# Patient Record
Sex: MALE | Race: WHITE
[De-identification: names, ages, dates, MRNs, and addresses within clinical notes are randomized per-mention and may not be internally consistent; named-entity substitution may affect disease eponyms.]

---

## 2018-07-24 ENCOUNTER — HOSPITAL ENCOUNTER (EMERGENCY)
Dept: HOSPITAL 41 - JD.ED | Age: 33
Discharge: HOME | End: 2018-07-24
Payer: MEDICAID

## 2018-07-24 VITALS — SYSTOLIC BLOOD PRESSURE: 141 MMHG | DIASTOLIC BLOOD PRESSURE: 94 MMHG

## 2018-07-24 DIAGNOSIS — S61.411A: Primary | ICD-10-CM
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
## 2018-07-24 NOTE — EDM.PDOC
ED HPI GENERAL MEDICAL PROBLEM





- General


Chief Complaint: Upper Extremity Injury/Pain


Stated Complaint: R THUMB LAC


Time Seen by Provider: 07/24/18 10:56


Source of Information: Reports: Patient


History Limitations: Reports: No Limitations





- History of Present Illness


INITIAL COMMENTS - FREE TEXT/NARRATIVE: 





33-year-old male presents for evaluation and treatment the laceration to the 

right dorsal hand. Injury occurred prior to arrival in the ER. Patient is self-

employed. He was at work. His cutting some tin for a roof when he accidentally 

cut himself. He has a 2 cm laceration to the right dorsal hand over the 

proximal phalnex. He denies any numbness or tingling. States it bled 

extensively after the injury but bleeding is controlled now. No obvious 

decreased range of motion. Some discomfort with flexion.





Tetanus is up-to-date. Last tetanus was about 7 months ago.





Patient is right handed.


Onset: Today


Location: Reports: Upper Extremity, Right


  ** Right Hand


Pain Score (Numeric/FACES): 4





- Related Data


 Allergies











Allergy/AdvReac Type Severity Reaction Status Date / Time


 


No Known Allergies Allergy   Verified 07/24/18 10:35











Home Meds: 


 Home Meds





Albuterol Sulfate [Proair Hfa] 1 puff INH ASDIRECTED PRN 07/24/18 [History]











Past Medical History


Respiratory History: Reports: Asthma





Review of Systems





- Review of Systems


Review Of Systems: See Below


Musculoskeletal: Reports: Hand Pain (from wound), Other (no decreased ROM)


Skin: Reports: Wound (right dorsal hand)


Neurological: Denies: Numbness, Tingling





ED EXAM, GENERAL





- Physical Exam


Exam: See Below


Exam Limited By: No Limitations


General Appearance: Alert, WD/WN, No Apparent Distress


Respiratory/Chest: No Respiratory Distress


Peripheral Pulses: 2+: Radial (R)


Extremities: Normal Range of Motion (patient is able to flex and extend right 

thumb with and without resistance. Able to adduct and abudct thumb with and 

withot resistance. Able to oppose thumb to all fingers. Able to make a fist. ), 

Normal Capillary Refill


Neurological: Alert, Oriented, Normal Cognition


Psychiatric: Normal Affect, Normal Mood


Skin Exam: Warm, Dry, Wound/Incision (2cm laceration to the dorsal right hand 

over the distal metacarpal)





ED TRAUMA EXTREMITY PROCEDURES





- Laceration/Wound Repair


  ** Right Dorsal Digit - 1st (Thumb)


Lac/Wound Length In cm: 2


Appearance: Subcutaneous, Clean


Distal NVT: Neuro & Vascular Intact, No Tendon Injury


Anesthetic Type: Local


Local Anesthesia - Lidocaine (Xylocaine): 1% Plain


Local Anesthetic Volume: 3cc


Skin Prep: Chlorhexidine (Hibiciens), Saline, Sterile Drape


Exploration/Debridement/Repair: Wound Explored, No Foreign Material Found


Closed With: Sutures


Suture Size: 4-0


# of Sutures: 6


Suture Type: Nylon, Interrupted, Simple


Sterile Dressing Applied: Nurse


Tetanus Status Addressed: Yes


Complications: No





Course





- Vital Signs


Last Recorded V/S: 


 Last Vital Signs











Temp  97.6 F   07/24/18 10:36


 


Pulse  95   07/24/18 10:36


 


Resp  18   07/24/18 10:36


 


BP  141/94 H  07/24/18 10:36


 


Pulse Ox  95   07/24/18 10:36














- Orders/Labs/Meds


Meds: 


Medications














Discontinued Medications














Generic Name Dose Route Start Last Admin





  Trade Name Freq  PRN Reason Stop Dose Admin


 


Lidocaine HCl  50 ml  07/24/18 11:36  07/24/18 11:41





  Xylocaine 1%  INJECT  07/24/18 11:37  50 ml





  ONETIME ONE   Administration





     





     





     





     














- Re-Assessments/Exams


Free Text/Narrative Re-Assessment/Exam: 





07/24/18 12:01


6 sutures placed to the right dorsal hand over the proximal phalanx. Patient 

tolerated well. Range of motion tested again after sutures placed and patient 

is able to flex and extend right thumb both with and without resistance.





Discharge instructions as documented.











Departure





- Departure


Time of Disposition: 12:02


Disposition: Home, Self-Care 01


Condition: Fair


Clinical Impression: 


 Laceration








- Discharge Information


*PRESCRIPTION DRUG MONITORING PROGRAM REVIEWED*: No


*COPY OF PRESCRIPTION DRUG MONITORING REPORT IN PATIENT MARY: No


Instructions:  Laceration Care, Adult


Referrals: 


Jewels Kuo PA-C [Primary Care Provider] - 


Forms:  ED Department Discharge


Additional Instructions: 


Monitor the wound for signs of infection such as increased swelling, pus or 

redness. Present to the clinic or the ER should these develop. 





Wash the wound with gentle soap and water twice a day. Apply antibacterial 

ointment such as Neosporin or bacitracin to the wound twice a day. Keep the 

wound covered when you are in situations where there may become contaminated 

such as work. Otherwise open to air is good.





Have the sutures removed in 10 days. your primary care provider can do this. 

may also return to the ER or present to the [facility]. They are open 

Monday through Friday 8 AM to 5 PM and will remove the sutures for free. Call 

[phone] to schedule with a provider there.





Over-the-counter Tylenol or Motrin as needed for pain relief.





Please return to the ER if your symptoms change or worsen..

## 2023-07-05 ENCOUNTER — HOSPITAL ENCOUNTER (EMERGENCY)
Dept: HOSPITAL 41 - JD.ED | Age: 38
Discharge: HOME | End: 2023-07-05
Payer: COMMERCIAL
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
## 2023-07-05 DIAGNOSIS — S46.211A: Primary | ICD-10-CM

## 2023-07-05 DIAGNOSIS — J45.909: ICD-10-CM

## 2023-07-06 VITALS — SYSTOLIC BLOOD PRESSURE: 143 MMHG | DIASTOLIC BLOOD PRESSURE: 90 MMHG | HEART RATE: 77 BPM

## 2023-07-17 ENCOUNTER — HOSPITAL ENCOUNTER (OUTPATIENT)
Dept: HOSPITAL 41 - JD.SDS | Age: 38
Discharge: HOME | End: 2023-07-17
Attending: ORTHOPAEDIC SURGERY
Payer: COMMERCIAL

## 2023-07-17 VITALS — DIASTOLIC BLOOD PRESSURE: 90 MMHG | HEART RATE: 80 BPM | SYSTOLIC BLOOD PRESSURE: 139 MMHG

## 2023-07-17 DIAGNOSIS — Z79.899: ICD-10-CM
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
## 2023-07-17 DIAGNOSIS — J45.30: ICD-10-CM

## 2023-07-17 DIAGNOSIS — S46.211A: Primary | ICD-10-CM

## 2023-07-17 DIAGNOSIS — G47.30: ICD-10-CM

## 2023-07-17 DIAGNOSIS — J30.2: ICD-10-CM

## 2023-07-17 PROCEDURE — 76000 FLUOROSCOPY <1 HR PHYS/QHP: CPT

## 2023-07-17 PROCEDURE — 23430 REPAIR BICEPS TENDON: CPT

## 2023-07-17 PROCEDURE — 64447 NJX AA&/STRD FEMORAL NRV IMG: CPT

## 2023-07-17 PROCEDURE — C1776 JOINT DEVICE (IMPLANTABLE): HCPCS

## 2024-08-16 ENCOUNTER — HOSPITAL ENCOUNTER (EMERGENCY)
Dept: HOSPITAL 41 - JD.ED | Age: 39
Discharge: HOME | End: 2024-08-16
Payer: COMMERCIAL

## 2024-08-16 VITALS — SYSTOLIC BLOOD PRESSURE: 165 MMHG | DIASTOLIC BLOOD PRESSURE: 102 MMHG | HEART RATE: 106 BPM
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
## 2024-08-16 DIAGNOSIS — S61.412A: Primary | ICD-10-CM

## 2024-08-16 DIAGNOSIS — J45.909: ICD-10-CM

## 2024-08-16 DIAGNOSIS — Z79.51: ICD-10-CM
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
